# Patient Record
Sex: FEMALE | Race: WHITE | Employment: UNEMPLOYED | URBAN - METROPOLITAN AREA
[De-identification: names, ages, dates, MRNs, and addresses within clinical notes are randomized per-mention and may not be internally consistent; named-entity substitution may affect disease eponyms.]

---

## 2017-11-17 ENCOUNTER — HOSPITAL ENCOUNTER (OUTPATIENT)
Dept: MAMMOGRAPHY | Facility: CLINIC | Age: 62
Discharge: HOME | End: 2017-11-17
Attending: OBSTETRICS & GYNECOLOGY | Admitting: OBSTETRICS & GYNECOLOGY
Payer: COMMERCIAL

## 2017-11-17 DIAGNOSIS — Z12.31 VISIT FOR SCREENING MAMMOGRAM: ICD-10-CM

## 2017-11-17 PROCEDURE — G0202 SCR MAMMO BI INCL CAD: HCPCS

## 2020-09-04 ENCOUNTER — APPOINTMENT (RX ONLY)
Dept: URBAN - METROPOLITAN AREA CLINIC 134 | Facility: CLINIC | Age: 65
Setting detail: DERMATOLOGY
End: 2020-09-04

## 2020-09-04 DIAGNOSIS — L81.4 OTHER MELANIN HYPERPIGMENTATION: ICD-10-CM

## 2020-09-04 DIAGNOSIS — D22 MELANOCYTIC NEVI: ICD-10-CM

## 2020-09-04 DIAGNOSIS — Z71.89 OTHER SPECIFIED COUNSELING: ICD-10-CM

## 2020-09-04 DIAGNOSIS — D18.0 HEMANGIOMA: ICD-10-CM

## 2020-09-04 DIAGNOSIS — L82.1 OTHER SEBORRHEIC KERATOSIS: ICD-10-CM

## 2020-09-04 PROBLEM — D18.01 HEMANGIOMA OF SKIN AND SUBCUTANEOUS TISSUE: Status: ACTIVE | Noted: 2020-09-04

## 2020-09-04 PROBLEM — D23.71 OTHER BENIGN NEOPLASM OF SKIN OF RIGHT LOWER LIMB, INCLUDING HIP: Status: ACTIVE | Noted: 2020-09-04

## 2020-09-04 PROBLEM — D22.61 MELANOCYTIC NEVI OF RIGHT UPPER LIMB, INCLUDING SHOULDER: Status: ACTIVE | Noted: 2020-09-04

## 2020-09-04 PROCEDURE — 99203 OFFICE O/P NEW LOW 30 MIN: CPT

## 2020-09-04 PROCEDURE — ? COUNSELING

## 2020-09-04 ASSESSMENT — LOCATION DETAILED DESCRIPTION DERM
LOCATION DETAILED: LEFT CENTRAL MALAR CHEEK
LOCATION DETAILED: RIGHT SUPERIOR LATERAL NECK
LOCATION DETAILED: LEFT ANTERIOR DISTAL THIGH
LOCATION DETAILED: MIDDLE STERNUM
LOCATION DETAILED: RIGHT DISTAL DORSAL FOREARM
LOCATION DETAILED: SUPERIOR THORACIC SPINE

## 2020-09-04 ASSESSMENT — LOCATION ZONE DERM
LOCATION ZONE: FACE
LOCATION ZONE: LEG
LOCATION ZONE: TRUNK
LOCATION ZONE: ARM
LOCATION ZONE: NECK

## 2020-09-04 ASSESSMENT — LOCATION SIMPLE DESCRIPTION DERM
LOCATION SIMPLE: RIGHT ANTERIOR NECK
LOCATION SIMPLE: LEFT THIGH
LOCATION SIMPLE: UPPER BACK
LOCATION SIMPLE: RIGHT FOREARM
LOCATION SIMPLE: LEFT CHEEK
LOCATION SIMPLE: CHEST

## 2021-11-23 ENCOUNTER — APPOINTMENT (RX ONLY)
Dept: URBAN - METROPOLITAN AREA CLINIC 134 | Facility: CLINIC | Age: 66
Setting detail: DERMATOLOGY
End: 2021-11-23

## 2021-11-23 VITALS — HEIGHT: 64 IN | WEIGHT: 135 LBS

## 2021-11-23 DIAGNOSIS — D22 MELANOCYTIC NEVI: ICD-10-CM

## 2021-11-23 DIAGNOSIS — L82.1 OTHER SEBORRHEIC KERATOSIS: ICD-10-CM

## 2021-11-23 DIAGNOSIS — L57.8 OTHER SKIN CHANGES DUE TO CHRONIC EXPOSURE TO NONIONIZING RADIATION: ICD-10-CM

## 2021-11-23 DIAGNOSIS — D18.0 HEMANGIOMA: ICD-10-CM

## 2021-11-23 DIAGNOSIS — L57.0 ACTINIC KERATOSIS: ICD-10-CM

## 2021-11-23 DIAGNOSIS — L81.4 OTHER MELANIN HYPERPIGMENTATION: ICD-10-CM

## 2021-11-23 PROBLEM — D18.01 HEMANGIOMA OF SKIN AND SUBCUTANEOUS TISSUE: Status: ACTIVE | Noted: 2021-11-23

## 2021-11-23 PROBLEM — D23.71 OTHER BENIGN NEOPLASM OF SKIN OF RIGHT LOWER LIMB, INCLUDING HIP: Status: ACTIVE | Noted: 2021-11-23

## 2021-11-23 PROBLEM — D22.5 MELANOCYTIC NEVI OF TRUNK: Status: ACTIVE | Noted: 2021-11-23

## 2021-11-23 PROCEDURE — ? OBSERVATION

## 2021-11-23 PROCEDURE — 17000 DESTRUCT PREMALG LESION: CPT

## 2021-11-23 PROCEDURE — ? ADDITIONAL NOTES

## 2021-11-23 PROCEDURE — ? COUNSELING

## 2021-11-23 PROCEDURE — ? BENIGN DESTRUCTION

## 2021-11-23 PROCEDURE — 99213 OFFICE O/P EST LOW 20 MIN: CPT | Mod: 25

## 2021-11-23 PROCEDURE — ? LIQUID NITROGEN

## 2021-11-23 ASSESSMENT — LOCATION DETAILED DESCRIPTION DERM
LOCATION DETAILED: SUPERIOR LUMBAR SPINE
LOCATION DETAILED: SUPERIOR THORACIC SPINE
LOCATION DETAILED: LEFT ANTERIOR LATERAL DISTAL THIGH
LOCATION DETAILED: RIGHT MEDIAL UPPER BACK
LOCATION DETAILED: EPIGASTRIC SKIN
LOCATION DETAILED: INFERIOR LUMBAR SPINE
LOCATION DETAILED: LEFT CENTRAL TEMPLE

## 2021-11-23 ASSESSMENT — LOCATION SIMPLE DESCRIPTION DERM
LOCATION SIMPLE: LEFT THIGH
LOCATION SIMPLE: RIGHT UPPER BACK
LOCATION SIMPLE: UPPER BACK
LOCATION SIMPLE: ABDOMEN
LOCATION SIMPLE: LEFT TEMPLE
LOCATION SIMPLE: LOWER BACK

## 2021-11-23 ASSESSMENT — LOCATION ZONE DERM
LOCATION ZONE: TRUNK
LOCATION ZONE: FACE
LOCATION ZONE: LEG

## 2021-11-23 NOTE — PROCEDURE: LIQUID NITROGEN
Application Tool (Optional): Cry-AC
Number Of Freeze-Thaw Cycles: 2 freeze-thaw cycles
Duration Of Freeze Thaw-Cycle (Seconds): 4
Show Applicator Variable?: Yes
Render Note In Bullet Format When Appropriate: No
Post-Care Instructions: I reviewed with the patient in detail post-care instructions. Patient is to wear sunprotection, and avoid picking at any of the treated lesions. Pt may apply Vaseline to crusted or scabbing areas.
Detail Level: Detailed
Consent: The patient's consent was obtained including but not limited to risks of crusting, scabbing, blistering, scarring, darker or lighter pigmentary change, recurrence, incomplete removal and infection.

## 2021-11-23 NOTE — PROCEDURE: ADDITIONAL NOTES
Render Risk Assessment In Note?: no
Additional Notes: Patient will return in a month if lesion does not go completely away for further treatment
Detail Level: Simple

## 2021-11-23 NOTE — HPI: EVALUATION OF SKIN LESION(S)
What Type Of Note Output Would You Prefer (Optional)?: Bullet Format
Hpi Title: Evaluation of Skin Lesions
How Severe Are Your Spot(S)?: moderate
Have Your Spot(S) Been Treated In The Past?: has not been treated
Additional History: Spot of concern on Left temple

## 2021-11-23 NOTE — PROCEDURE: BENIGN DESTRUCTION
Detail Level: Detailed
Consent: The patient's consent was obtained including but not limited to risks of crusting, scabbing, blistering, scarring, darker or lighter pigmentary change, recurrence, incomplete removal and infection.
Add 52 Modifier (Optional): no
Include Z78.9 (Other Specified Conditions Influencing Health Status) As An Associated Diagnosis?: Yes
Post-Care Instructions: I reviewed with the patient in detail post-care instructions. Patient is to wear sunprotection, and avoid picking at any of the treated lesions. Pt may apply Vaseline to crusted or scabbing areas.
Medical Necessity Clause: This procedure was medically necessary because the lesions that were treated were: causing inflammation of the lip and crusting,  unclear if milia is causing the inflamed lip or they are two separate problems.  if lip not healed in1 month biopsy will be warranted.
Anesthesia Volume In Cc: 0.5
Medical Necessity Information: It is in your best interest to select a reason for this procedure from the list below. All of these items fulfill various CMS LCD requirements except the new and changing color options.

## 2022-03-18 ENCOUNTER — APPOINTMENT (RX ONLY)
Dept: URBAN - METROPOLITAN AREA CLINIC 134 | Facility: CLINIC | Age: 67
Setting detail: DERMATOLOGY
End: 2022-03-18

## 2022-03-18 VITALS — HEIGHT: 64 IN | WEIGHT: 130 LBS

## 2022-03-18 DIAGNOSIS — L57.0 ACTINIC KERATOSIS: ICD-10-CM

## 2022-03-18 PROCEDURE — 17000 DESTRUCT PREMALG LESION: CPT

## 2022-03-18 PROCEDURE — ? LIQUID NITROGEN

## 2022-03-18 PROCEDURE — ? COUNSELING

## 2022-03-18 ASSESSMENT — LOCATION SIMPLE DESCRIPTION DERM: LOCATION SIMPLE: LEFT TEMPLE

## 2022-03-18 ASSESSMENT — LOCATION ZONE DERM: LOCATION ZONE: FACE

## 2022-03-18 ASSESSMENT — LOCATION DETAILED DESCRIPTION DERM: LOCATION DETAILED: LEFT CENTRAL TEMPLE

## 2022-03-18 NOTE — PROCEDURE: LIQUID NITROGEN
Post-Care Instructions: I reviewed with the patient in detail post-care instructions. Patient is to wear sunprotection, and avoid picking at any of the treated lesions. Pt may apply Vaseline to crusted or scabbing areas.
Consent: The patient's consent was obtained including but not limited to risks of crusting, scabbing, blistering, scarring, darker or lighter pigmentary change, recurrence, incomplete removal and infection.
Show Applicator Variable?: Yes
Number Of Freeze-Thaw Cycles: 4 freeze-thaw cycles
Render Note In Bullet Format When Appropriate: No
Duration Of Freeze Thaw-Cycle (Seconds): 2
Detail Level: Detailed
Application Tool (Optional): Cry-AC

## 2022-04-29 ENCOUNTER — APPOINTMENT (RX ONLY)
Dept: URBAN - METROPOLITAN AREA CLINIC 134 | Facility: CLINIC | Age: 67
Setting detail: DERMATOLOGY
End: 2022-04-29

## 2022-04-29 VITALS — HEIGHT: 64 IN | WEIGHT: 130 LBS

## 2022-04-29 DIAGNOSIS — L57.0 ACTINIC KERATOSIS: ICD-10-CM | Status: RESOLVED

## 2022-04-29 PROCEDURE — 99212 OFFICE O/P EST SF 10 MIN: CPT

## 2022-04-29 PROCEDURE — ? COUNSELING

## 2022-04-29 ASSESSMENT — LOCATION SIMPLE DESCRIPTION DERM: LOCATION SIMPLE: LEFT TEMPLE

## 2022-04-29 ASSESSMENT — LOCATION DETAILED DESCRIPTION DERM: LOCATION DETAILED: LEFT CENTRAL TEMPLE

## 2022-04-29 ASSESSMENT — LOCATION ZONE DERM: LOCATION ZONE: FACE

## 2023-06-30 ENCOUNTER — APPOINTMENT (RX ONLY)
Dept: URBAN - METROPOLITAN AREA CLINIC 133 | Facility: CLINIC | Age: 68
Setting detail: DERMATOLOGY
End: 2023-06-30

## 2023-06-30 VITALS — HEIGHT: 64 IN | WEIGHT: 128 LBS

## 2023-06-30 DIAGNOSIS — L81.4 OTHER MELANIN HYPERPIGMENTATION: ICD-10-CM

## 2023-06-30 DIAGNOSIS — L82.1 OTHER SEBORRHEIC KERATOSIS: ICD-10-CM

## 2023-06-30 DIAGNOSIS — L85.8 OTHER SPECIFIED EPIDERMAL THICKENING: ICD-10-CM

## 2023-06-30 DIAGNOSIS — Z71.89 OTHER SPECIFIED COUNSELING: ICD-10-CM

## 2023-06-30 DIAGNOSIS — D22 MELANOCYTIC NEVI: ICD-10-CM

## 2023-06-30 DIAGNOSIS — L57.0 ACTINIC KERATOSIS: ICD-10-CM

## 2023-06-30 DIAGNOSIS — L85.3 XEROSIS CUTIS: ICD-10-CM

## 2023-06-30 DIAGNOSIS — D18.0 HEMANGIOMA: ICD-10-CM

## 2023-06-30 PROBLEM — D22.5 MELANOCYTIC NEVI OF TRUNK: Status: ACTIVE | Noted: 2023-06-30

## 2023-06-30 PROBLEM — D18.01 HEMANGIOMA OF SKIN AND SUBCUTANEOUS TISSUE: Status: ACTIVE | Noted: 2023-06-30

## 2023-06-30 PROCEDURE — ? COUNSELING

## 2023-06-30 PROCEDURE — ? LIQUID NITROGEN

## 2023-06-30 PROCEDURE — 99213 OFFICE O/P EST LOW 20 MIN: CPT | Mod: 25

## 2023-06-30 PROCEDURE — ? TREATMENT REGIMEN

## 2023-06-30 PROCEDURE — 17000 DESTRUCT PREMALG LESION: CPT

## 2023-06-30 ASSESSMENT — LOCATION DETAILED DESCRIPTION DERM
LOCATION DETAILED: LEFT INFERIOR CENTRAL MALAR CHEEK
LOCATION DETAILED: RIGHT MEDIAL PLANTAR HEEL
LOCATION DETAILED: LEFT LATERAL PLANTAR HEEL
LOCATION DETAILED: RIGHT DISTAL POSTERIOR UPPER ARM
LOCATION DETAILED: LEFT MEDIAL UPPER BACK
LOCATION DETAILED: LEFT PROXIMAL DORSAL FOREARM
LOCATION DETAILED: LEFT INFERIOR LATERAL FOREHEAD
LOCATION DETAILED: PERIUMBILICAL SKIN
LOCATION DETAILED: RIGHT DISTAL DORSAL FOREARM
LOCATION DETAILED: LEFT DISTAL DORSAL FOREARM
LOCATION DETAILED: LEFT INFERIOR LATERAL MIDBACK
LOCATION DETAILED: RIGHT PROXIMAL DORSAL FOREARM
LOCATION DETAILED: LEFT DISTAL POSTERIOR UPPER ARM

## 2023-06-30 ASSESSMENT — LOCATION SIMPLE DESCRIPTION DERM
LOCATION SIMPLE: ABDOMEN
LOCATION SIMPLE: LEFT LOWER BACK
LOCATION SIMPLE: RIGHT PLANTAR SURFACE
LOCATION SIMPLE: LEFT POSTERIOR UPPER ARM
LOCATION SIMPLE: LEFT PLANTAR SURFACE
LOCATION SIMPLE: RIGHT POSTERIOR UPPER ARM
LOCATION SIMPLE: LEFT CHEEK
LOCATION SIMPLE: LEFT FOREARM
LOCATION SIMPLE: LEFT UPPER BACK
LOCATION SIMPLE: RIGHT FOREARM
LOCATION SIMPLE: LEFT FOREHEAD

## 2023-06-30 ASSESSMENT — LOCATION ZONE DERM
LOCATION ZONE: TRUNK
LOCATION ZONE: ARM
LOCATION ZONE: FEET
LOCATION ZONE: FACE

## 2023-06-30 NOTE — PROCEDURE: TREATMENT REGIMEN
Continue Regimen: Zinc based sunscreen daily
Detail Level: Zone
Initiate Treatment: CeraVeSA, apply nightly to arms and shower off in AM
Modify Regimen: Limit soap to odorous areas only
Initiate Treatment: Consider applying PC Xerosis cream to her heels nightly.

## 2023-06-30 NOTE — HPI: EVALUATION OF SKIN LESION(S)
Hpi Title: Evaluation of Skin Lesions
Additional History: No specific concerns.  She states that you treated a lesion on her left cheek last time and she just would like this re checked today.

## 2023-06-30 NOTE — PROCEDURE: LIQUID NITROGEN
Show Aperture Variable?: Yes
Consent: The patient's consent was obtained including but not limited to risks of crusting, scabbing, blistering, scarring, darker or lighter pigmentary change, recurrence, incomplete removal and infection.
Render Note In Bullet Format When Appropriate: No
Application Tool (Optional): Cry-AC
Duration Of Freeze Thaw-Cycle (Seconds): 3
Post-Care Instructions: I reviewed with the patient in detail post-care instructions. Patient is to wear sunprotection, and avoid picking at any of the treated lesions. Pt may apply Vaseline to crusted or scabbing areas.
Number Of Freeze-Thaw Cycles: 2 freeze-thaw cycles
Detail Level: Detailed

## 2025-03-20 ENCOUNTER — APPOINTMENT (OUTPATIENT)
Dept: URBAN - METROPOLITAN AREA CLINIC 133 | Facility: CLINIC | Age: 70
Setting detail: DERMATOLOGY
End: 2025-03-20

## 2025-03-20 DIAGNOSIS — Z71.89 OTHER SPECIFIED COUNSELING: ICD-10-CM

## 2025-03-20 DIAGNOSIS — L72.0 EPIDERMAL CYST: ICD-10-CM

## 2025-03-20 DIAGNOSIS — L82.1 OTHER SEBORRHEIC KERATOSIS: ICD-10-CM

## 2025-03-20 DIAGNOSIS — D18.0 HEMANGIOMA: ICD-10-CM

## 2025-03-20 DIAGNOSIS — D22 MELANOCYTIC NEVI: ICD-10-CM

## 2025-03-20 DIAGNOSIS — L81.4 OTHER MELANIN HYPERPIGMENTATION: ICD-10-CM

## 2025-03-20 PROBLEM — D18.01 HEMANGIOMA OF SKIN AND SUBCUTANEOUS TISSUE: Status: ACTIVE | Noted: 2025-03-20

## 2025-03-20 PROBLEM — D22.5 MELANOCYTIC NEVI OF TRUNK: Status: ACTIVE | Noted: 2025-03-20

## 2025-03-20 PROBLEM — D23.71 OTHER BENIGN NEOPLASM OF SKIN OF RIGHT LOWER LIMB, INCLUDING HIP: Status: ACTIVE | Noted: 2025-03-20

## 2025-03-20 PROCEDURE — ? COUNSELING

## 2025-03-20 PROCEDURE — 99213 OFFICE O/P EST LOW 20 MIN: CPT

## 2025-03-20 PROCEDURE — ? SUNSCREEN RECOMMENDATIONS

## 2025-03-20 PROCEDURE — ? TREATMENT REGIMEN

## 2025-03-20 ASSESSMENT — LOCATION ZONE DERM
LOCATION ZONE: LEG
LOCATION ZONE: TRUNK
LOCATION ZONE: ARM
LOCATION ZONE: FACE

## 2025-03-20 ASSESSMENT — LOCATION DETAILED DESCRIPTION DERM
LOCATION DETAILED: PERIUMBILICAL SKIN
LOCATION DETAILED: LEFT INFERIOR CENTRAL MALAR CHEEK
LOCATION DETAILED: RIGHT PROXIMAL DORSAL FOREARM
LOCATION DETAILED: LEFT PROXIMAL DORSAL FOREARM
LOCATION DETAILED: LEFT DISTAL LATERAL POSTERIOR THIGH
LOCATION DETAILED: XIPHOID
LOCATION DETAILED: LEFT MEDIAL UPPER BACK
LOCATION DETAILED: RIGHT MEDIAL BREAST 3-4:00 REGION

## 2025-03-20 ASSESSMENT — LOCATION SIMPLE DESCRIPTION DERM
LOCATION SIMPLE: RIGHT FOREARM
LOCATION SIMPLE: LEFT UPPER BACK
LOCATION SIMPLE: LEFT CHEEK
LOCATION SIMPLE: LEFT POSTERIOR THIGH
LOCATION SIMPLE: RIGHT BREAST
LOCATION SIMPLE: LEFT FOREARM
LOCATION SIMPLE: ABDOMEN

## 2025-03-20 NOTE — PROCEDURE: SUNSCREEN RECOMMENDATIONS
Products Recommended: Zinc oxide or mineral sunscreens to be worn daily, sun protective clothing ( pt claims he sweats too much to wear t shirts, and only wears sleeveless shirts).
General Sunscreen Counseling: I recommended a broad spectrum sunscreen with a SPF of 30 or higher.  I explained that SPF 30 sunscreens block approximately 97 percent of the sun's harmful rays.  Sunscreens should be applied at least 15 minutes prior to expected sun exposure and then every 2 hours after that as long as sun exposure continues. If swimming or exercising sunscreen should be reapplied every 45 minutes to an hour after getting wet or sweating.  One ounce, or the equivalent of a shot glass full of sunscreen, is adequate to protect the skin not covered by a bathing suit. I also recommended a lip balm with a sunscreen as well. Sun protective clothing can be used in lieu of sunscreen but must be worn the entire time you are exposed to the sun's rays. Dr. trent favors any sunscreen with a zinc oxide composition of 6% or higher.
Detail Level: Detailed